# Patient Record
(demographics unavailable — no encounter records)

---

## 2024-10-15 NOTE — HISTORY OF PRESENT ILLNESS
[de-identified] : pentasmidine last thu last bactrim 10/6  ex FT, born in [place].  4yo dx with bronchiolitis obliterans

## 2024-10-15 NOTE — HISTORY OF PRESENT ILLNESS
[de-identified] : pentasmidine last thu last bactrim 10/6  ex FT, born in [place].  2yo dx with bronchiolitis obliterans

## 2024-10-15 NOTE — HISTORY OF PRESENT ILLNESS
[de-identified] : pentasmidine last thu last bactrim 10/6  ex FT, born in [place].  2yo dx with bronchiolitis obliterans

## 2024-11-05 NOTE — HISTORY OF PRESENT ILLNESS
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 His mother reports that he developed a fever yesterday and has been increasingly lethargic today, with difficulty taking his medications. He has no other associated symptoms such as runny nose, headache, or sore throat. He is drinking water but has a decreased appetite and has been sleeping most of the day. He is currently taking 15mg of Prednisone daily, and has not received any stress dose steroids for this episode of fever.  Pertinent Past History:  - Significant Past Medical History: ABCA3 mutation, post-adenoviral bronchiolitis obliterans, pneumomediastinum, H. 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  - Surgical History: Bilateral lung transplant (6/7/24), pleural biopsy.  - Medications: Prednisone 15mg daily, Tacrolimus 1mg BID (dose adjusted on 10/10/24 due to subtherapeutic level), Mycophenolate (dose unchanged), inhaled Pentamidine for PJP prophylaxis (switched from Bactrim due to neutropenia), Fluconazole (discontinued on 9/12/24 due to resolution of Clavispora infection and drug interaction with Tacrolimus), Valcyte (consideration for discontinuation pending cell counts), Amlodipine (for hypertension management, pre-transplant), Vitamin D, Levalbuterol PRN, Acapella, incentive spirometry. Hydrocortisone for stress dosing.  Summary of Prior Encounters:  - Office Visit (8/16/24): Post-transplant follow-up, doing well, breathing room air, no respiratory complaints. Difficulty with home spirometry technique. Attending cardiac rehab twice weekly. Planning to start home schooling. - [facility] (8/8/24): 8 weeks post-transplant, doing well. Normal spirometry, significantly improved from pre-transplant. CXR showed post-surgical changes, no acute disease. Difficulty with home spirometry technique. Two-week bronchoscopy (6/25/24) showed plaques and secretions, treated for Clavispora lusitaniae. Six-week bronchoscopy (7/26/24) showed resolution of plaques and negative cultures. On Tacrolimus, Mycophenolate, Prednisone, Valganciclovir, Fluconazole, Amlodipine, Vitamin D. Attending PT twice weekly. - [facility] (8/20/24): 10 weeks post-transplant, doing well. Normal spirometry. CXR normal. BAL from 7/26/24 showed Mycobacterium avium complex (MAC). Plan for 3-month bronchoscopy with biopsies. Medications continued. Cleared to return home to [place]. - Office Visit (9/11/24): Presented with fatigue, decreased appetite, vomiting, and fever. Tested positive for COVID-19. Started on Remdesivir. Dehydration management. Missed medications retaken. [facility] transplant team consulted. Throat culture, HSV PCR, and RVP ordered. Chest x-ray ordered. - [facility]/ID (9/12/24): 3 months post-transplant, doing well despite recent COVID-19 diagnosis. CXR and PFTs reassuring. Tacrolimus level supratherapeutic, dose adjusted. Bronchoscopy rescheduled due to COVID-19. Flu vaccine deferred. CBC showed leukopenia and anemia. ID consult recommended chest CT with contrast for MAC evaluation, discontinued Fluconazole. - Hematology Consult (10/24/24): Evaluated for neutropenia and anemia. Neutropenia likely drug-induced, improving since stopping Bactrim. Anemia likely mixed etiology (chronic illness and iron deficiency). Recommended IV Venofer. - [facility] (10/10/24): 4 months post-transplant, doing well. Chest CT obtained, results pending. Scheduled for bronchoscopy. Tacrolimus level subtherapeutic, dose increased. Switched to inhaled pentamidine for PJP prophylaxis due to neutropenia. Consider discontinuing Valcyte. Flu vaccine to be administered by PCP.

## 2024-11-05 NOTE — DISCUSSION/SUMMARY
[FreeTextEntry1] : Assessment:  Fever - etiology unclear, possibly related to post-transplant immunosuppression, viral illness, or other infection. Stress dose steroids indicated. History of bilateral lung transplant.  Plan:  1. Fever Management: Start stress dose steroids. Increase Prednisone to 30mg daily until fever resolves. Monitor closely for any other developing symptoms.  2. Diagnostics: Throat and nose swabs sent for culture. Blood work sent for CBC, CMP, and other studies as indicated.  3. Hydration: Encourage continued hydration with water or electrolyte solutions.  4. Medications: Continue current medication regimen, with Prednisone dose adjusted as above. Ensure Nikolay is able to take his medications reliably.  Follow-up: Close follow-up is essential. Recheck in 24-48 hours or sooner if symptoms worsen. Continue planned follow-up with [facility] transplant team.  Coordinated care with [facility] Transplant Team.

## 2024-11-05 NOTE — PHYSICAL EXAM
[Acute Distress] : no acute distress [Alert] : alert [Tired appearing] : tired appearing [NL] : moves all extremities x4, warm, well perfused x4 [FreeTextEntry1] : Cushingoid appearance [FreeTextEntry7] : Slight difference between the bases of the left and right lungs, with increased rhonchi at right base [de-identified] : Cold sore on the left corner of the mouth

## 2024-11-05 NOTE — END OF VISIT
DISCHARGE PLANNING    • Discharge to home or other facility with appropriate resources Progressing        GASTROINTESTINAL - ADULT    • Minimal or absence of nausea and vomiting Progressing    • Maintains or returns to baseline bowel function Progressing [Time Spent: ___ minutes] : I have spent [unfilled] minutes of time on the encounter which excludes teaching and separately reported services.

## 2024-12-03 NOTE — HISTORY OF PRESENT ILLNESS
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 Approximately two hours after the injection, he experienced increased fatigue and wanted to sleep. That same night, he developed a fever of 103F (39.4C). The following morning, the fever persisted, and his mother started him on Tylenol. He was seen in our office on Thursday and Friday last week for evaluation of these symptoms. Blood work at that time showed slightly low levels, but he was deemed stable for discharge home. However, he spiked another fever Friday night after returning home. He continues to experience fatigue, decreased appetite, and shakiness. He has been sleeping excessively, has little interest in eating, and reports chills, cough, and runny nose. He reports the shakiness has been present for a few weeks. He is currently taking 15mg of Prednisone daily and has not received any stress dose steroids for this current episode of fever.  Significant Past Medical History:  ABCA3 mutation Post-adenoviral bronchiolitis obliterans Pneumomediastinum H. flu pneumonitis History of oxygen and BiPAP dependence pre-transplant Steroid-induced adrenal insufficiency Iron deficiency anemia (resolved) History of HSV infection (September 2024) COVID-19 infection (September 2024) Mycobacterium avium complex (MAC) infection (diagnosed July 2024) Neutropenia (attributed to medications)  Surgical History:  Bilateral lung transplant (6/7/24) Pleural biopsy  Medications:  Prednisone 15mg daily Tacrolimus 1mg BID (dose adjusted on 10/10/24 due to subtherapeutic level) Mycophenolate (dose unchanged) Inhaled Pentamidine for PJP prophylaxis (switched from Bactrim due to neutropenia) Fluconazole (discontinued on 9/12/24 due to resolution of Clavispora infection and drug interaction with Tacrolimus) Valcyte (consideration for discontinuation pending cell counts) Amlodipine (for hypertension management, started pre-transplant) Vitamin D Levalbuterol PRN Acapella Incentive spirometry Hydrocortisone for stress dosing Tylenol as needed for fever

## 2024-12-03 NOTE — PHYSICAL EXAM
[Acute Distress] : no acute distress [Alert] : alert [Tired appearing] : tired appearing [NL] : moves all extremities x4, warm, well perfused x4 [FreeTextEntry1] : Cushingoid appearance, tired appearing, but in no acute distress, alert. [FreeTextEntry7] : Slight difference between the bases of the left and right lungs, with increased rhonchi at right base

## 2024-12-03 NOTE — DISCUSSION/SUMMARY
[FreeTextEntry1] : Diagnostic Results:  Rapid flu test: Negative. COVID-19 test: Result pending. Respiratory Viral Panel (RVP): Ordered. CBC, CMP: Ordered.  Assessment: 1. Fever: Likely related to viral illness, possible post-Neupogen injection reaction, or other infection in the setting of immunosuppression. 2. Fatigue, decreased appetite, shakiness: Likely related to ongoing viral illness and/or medication side effects. 3. Dehydration: Possible given decreased oral intake, fever, and reported decreased urination. 4. Status post bilateral lung transplant: Requires close monitoring for complications such as infection and rejection. 5. HSV reactivation: Evidenced by cold sore. 6. Neutropenia: History of drug-induced neutropenia, currently being managed with Neupogen injections. 7. Mycobacterium avium complex (MAC) infection: Diagnosed in July 2024, requires ongoing monitoring.  Plan: 1. Contacted [facility] Lung Transplant Team: Discussed with Danika Thompson NP. 2. Antibiotics: Start Levofloxacin (Levaquin) 500mg daily x 14 days. 3. Anti-emetic: Start Zofran to improve tolerance of liquids and medications. 4. Antipyretic: Continue Tylenol as needed for fever management. 5. Hydration: Encourage increased fluid intake with Pedialyte or Gatorade. 6. Diagnostics: - Respiratory Viral Panel (RVP). - Complete Blood Count (CBC) and Comprehensive Metabolic Panel (CMP). 7. Follow-up: Close follow-up is essential. Recheck in 24-48 hours or sooner if symptoms worsen. Continue planned follow-up with [facility] transplant team on 9/12 and 9/13 for bronchoscopy.  Instructions:  Call the office immediately if symptoms worsen, including increased work of breathing, decreased oxygen saturation, persistent vomiting, or inability to tolerate oral fluids. Continue monitoring urine output and encourage frequent hydration.

## 2024-12-03 NOTE — DISCUSSION/SUMMARY
[FreeTextEntry1] : Diagnostic Results:  Rapid flu test: Negative. COVID-19 test: Result pending. Respiratory Viral Panel (RVP): Ordered. CBC, CMP: Ordered.  Assessment: 1. Fever: Likely related to viral illness, possible post-Neupogen injection reaction, or other infection in the setting of immunosuppression. 2. Fatigue, decreased appetite, shakiness: Likely related to ongoing viral illness and/or medication side effects. 3. Dehydration: Possible given decreased oral intake, fever, and reported decreased urination. 4. Status post bilateral lung transplant: Requires close monitoring for complications such as infection and rejection. 5. HSV reactivation: Evidenced by cold sore. 6. Neutropenia: History of drug-induced neutropenia, currently being managed with Neupogen injections. 7. Mycobacterium avium complex (MAC) infection: Diagnosed in July 2024, requires ongoing monitoring.  Plan: 1. Contacted [facility] Lung Transplant Team: Discussed with Danika Thompson NP. 2. Antibiotics: Start Levofloxacin (Levaquin) 500mg daily x 14 days. 3. Anti-emetic: Start Zofran to improve tolerance of liquids and medications. 4. Antipyretic: Continue Tylenol as needed for fever management. 5. Hydration: Encourage increased fluid intake with Pedialyte or Gatorade. 6. Diagnostics: - Respiratory Viral Panel (RVP). - Complete Blood Count (CBC) and Comprehensive Metabolic Panel (CMP). 7. Follow-up: Close follow-up is essential. Recheck in 24-48 hours or sooner if symptoms worsen. Continue planned follow-up with [facility] transplant team on 9/12 and 9/13 for bronchoscopy.  Instructions:  Call the office immediately if symptoms worsen, including increased work of breathing, decreased oxygen saturation, persistent vomiting, or inability to tolerate oral fluids. Continue monitoring urine output and encourage frequent hydration.

## 2024-12-04 NOTE — BEGINNING OF VISIT
[Medical Records] : medical records [Other: _____] : [unfilled] [Mother] : mother [FreeTextEntry1] : [facility]

## 2024-12-04 NOTE — DISCUSSION/SUMMARY
[FreeTextEntry1] : Nikolay is a 10-year-old male, status post bilateral lung transplant, presenting with recurrent fatigue, decreased appetite, and fever for several days, concerning for possible superimposed infection in the setting of known MAC and recent COVID-19 and enterovirus infections.  Assessment:  Recurrent fever, fatigue, and decreased appetite: Differential includes persistent viral infection (COVID-19, enterovirus), new viral or bacterial infection, or possible MAC exacerbation. Status post bilateral lung transplant: Increased risk of infection due to immunosuppression. History of MAC infection: Requires ongoing monitoring and possible treatment adjustment. History of steroid-induced adrenal insufficiency: Consider stress-dose steroids if fever worsens. ABCA3 mutation, post-adenoviral bronchiolitis obliterans, pneumomediastinum, H. influenzae pneumonitis: Underlying lung disease history. Iron deficiency anemia (stable, not supplementing): Monitor hemoglobin. Pre-transplant hypertension (managed on amlodipine): Monitor blood pressure.  Plan: Diagnostics: - Repeat Respiratory Viral Panel (RVP).. Previously COVID positive, but Paxlovid has multiple interactions with immunosuppressants and child is now out of the window for remdesivir.  - Chest x-ray (CXR) today. - Complete metabolic panel (CMP). - C-reactive protein (CRP). - CMV titers, per [facility] Transplant Team  Treatment: - Empiric Azithromycin for presumed Mycobacterium. - Aggressive hydration. - Start stress dose steroids - prednisone 20mg twice daily.  Consults: Spoke with [facility] Lung Transplant team. Will continue close communication.  Monitoring: - Close follow-up for worsening symptoms. - Monitor urine output and hydration status.  Scheduled Procedures: - Bronchoscopy with the transplant team remains scheduled for Thursday, 9/12, and Friday, 9/13.  Patient Education: - Instructed to return to the office or contact [facility] immediately if symptoms worsen, including increased shortness of breath, worsening fatigue, or high fever.

## 2024-12-04 NOTE — PHYSICAL EXAM
[Alert] : alert [Tired appearing] : tired appearing [NL] : warm, clear [Acute Distress] : no acute distress [Tachycardia] : tachycardia [FreeTextEntry1] : Cushingoid appearance, tired appearing, but in no acute distress, alert. [FreeTextEntry7] : Trace crackles at lung bases

## 2024-12-04 NOTE — HISTORY OF PRESENT ILLNESS
[FreeTextEntry6] : Nikolay, a 10-year-old male with a history of bilateral lung transplant (6/7/24), presents today with worsening fatigue, decreased appetite, and persistent fevers over the last few days. He was seen in this office one week ago (11/25/24) for similar symptoms, at which time a respiratory viral panel (RVP) was positive for both enterovirus and COVID-19. He is currently on his baseline dose of prednisone 15mg daily. He continues to take his other transplant medications as prescribed.  Pertinent Transplant History:  Bilateral lung transplant on 6/7/24 for obliterative bronchiolitis. No history of acute rejection episodes. Immunosuppression regimen: Tacrolimus, Mycophenolate, Prednisone. Bronchoscopy on 7/26/24: Resolved plaques, no fungal growth, but positive bronchoalveolar lavage (BAL) for Mycobacterium avium complex (MAC). Previous positive RVP for enterovirus and COVID-19 (11/25/24). Past Medical History: Significant for ABCA3 mutation, post-adenoviral bronchiolitis obliterans, pneumomediastinum, H. influenzae pneumonitis, history of steroid-induced adrenal insufficiency, iron deficiency anemia (currently stable, not supplementing), and pre-transplant hypertension (managed on amlodipine).  Medications: Tacrolimus, Mycophenolate, Prednisone 15mg daily, Fluconazole (for previous Clavispora lusitaniae infection), Amlodipine, Vitamin D. Other medications as needed for symptom management.

## 2024-12-23 NOTE — DISCUSSION/SUMMARY
[FreeTextEntry1] :  Continue antibiotics.  Resume all therapies and previous activities.  Continue home medications as outlined above. Monitor for any signs or symptoms of infection, rejection, or other complications and contact the transplant team immediately if any concerns arise.  Follow-up in 3 months for 10yo [facility] follow-up visit.

## 2024-12-23 NOTE — PHYSICAL EXAM
[NL] : no acute distress, alert [FreeTextEntry1] : Limited physical exam findings given that patients is on a video visit.

## 2024-12-23 NOTE — HISTORY OF PRESENT ILLNESS
[Home] : at home, [unfilled] , at the time of the visit. [Medical Office: ([facility])___] : at the medical office located in  [Mother] : mother [Verbal consent obtained from patient] : the patient, [unfilled] [de-identified] : Hospital discharge follow-up visit [FreeTextEntry6] : See RN telephone encounter: 12/20/24.  Admission Date: December 10, 2024 Discharge Date: December 18, 2024 Hospital: [facility] Attending Physician: Dr. Jasmine Jimenez  Admission Diagnoses: 1. Fever 2. Lung transplant status  Discharge Diagnoses: 1. Primary: Sinusitis due to Streptococcus pneumoniae 2. Secondary: Bronchiolitis obliterans status post bilateral lung transplant 3. Neutropenia (etiology undetermined) 4. Anemia resolved with transfusion 5. Adrenal insufficiency (secondary to chronic steroid use) 6. Hypertension  Medications at Discharge: Amlodipine 2.5mg daily Augmentin -42.9mg/5ml, 17ml every 12 hours until January 4, 2025 Cetirizine 5mg daily as needed Cholecalciferol 25mcg daily Vitamin D3 (high potency) 25mcg (1000 IU) daily Fluconazole 700mg daily (100mg tablet + three 200mg tablets) Levalbuterol HFA 45mcg/act, 2 puffs three times daily Mycophenolate Mofetil 500mg (two 250mg capsules) twice daily Ofloxacin 0.3% solution, 5 drops in right ear twice daily until December 21, 2024 Omeprazole 10mg daily, 30-60 minutes before food Zofran 4mg orally disintegrating tablet, three times daily as needed for nausea Prednisone 15mg daily (one 10mg tablet + one 5mg tablet) Sodium Chloride 3% nebulizer solution, 4ml inhaled twice daily as needed for cough and URI symptoms Solu-Cortef 100mg injection, 2ml intramuscularly once as needed for severe illness (and seek emergency care) Tacrolimus 1.5mg twice daily (one 0.5mg capsule + one 1mg capsule)  Hospital Course:  Nikolay, a 10-year-old male with a past medical history of bronchiolitis obliterans secondary to adenovirus infection status post bilateral lung transplant (June 2024), adrenal insufficiency, hypertension, and iron deficiency anemia, presented with several weeks of fever, cough, and decreased activity level in the setting of recent viral infections. He was admitted to the pulmonary service from December 10-18, 2024.  Infectious Workup: Initial blood culture, respiratory viral panel (RVP), and quad panel were unremarkable. Chest x-ray was clear, and his lung exam was unremarkable. He was hemodynamically stable, and antibiotics were initially deferred. Immunocompromised Infectious Disease (ICID) was consulted. A bronchoalveolar lavage (BAL) was performed on December 13, 2024, which showed no signs of acute rejection. Cultures from the BAL grew Streptococcus pneumoniae. Nikolay was started on intravenous (IV) Unasyn from December 14-16 and transitioned to oral Augmentin for a three-week course (December 16, 2024 - January 4, 2025).  Neutropenia: Neutropenia was noted on laboratory studies, raising concerns for viral suppression, medication effect, or an oncologic process. Mycophenolate Mofetil (MMF) was decreased to half the dose, and Hematology/Oncology was consulted. Concern for a liquid oncologic process was low; however, the pancytopenia seemed out of proportion to medication or viral etiology. A bone marrow biopsy was obtained concurrently with the bronchoscopy on December 13 and was largely normal. He received one dose of granulocyte-colony stimulating factor (G-CSF) and one infusion of intravenous immunoglobulin (IVIG) during his admission.  Otitis Media/Sinusitis: On December 13, during the bronchoscopy, purulent drainage was noted in his right ear. Culture of the drainage grew Streptococcus pneumoniae. A computed tomography (CT) scan of the sinuses on December 14 showed diffusely opacified paranasal sinuses, as well as bony thinning and demineralization involving the ethmoid roof bone and lamina papyracea, concerning for sinusitis. He was started on IV Unasyn as noted above and transitioned to oral Augmentin. Ofloxacin ear drops were prescribed for the right ear infection.  Anemia: Nikolay's hemoglobin dropped to the 7s during admission, related to pancytopenia. He received one unit of packed red blood cells (PRBCs) on December 16, after which his hemoglobin increased to 10.9 g/dL.  Adrenal Insufficiency: Endocrinology was consulted to clarify the stress dose steroid plan for his adrenal insufficiency, which is secondary to chronic steroid use. His current immunosuppression dose of Prednisone 15mg daily is considered sufficient for a moderate stress dose. If he comes off his immunosuppressive dose, the stress dose steroid plan will be revisited.  Hypertension: His hypertension is being managed with his home dose of amlodipine 2.5mg daily.  Discharge Plan:  Continue antibiotics.  Resume all therapies and previous activities.  Continue home medications as outlined above. Monitor for any signs or symptoms of infection, rejection, or other complications and contact the transplant team immediately if any concerns arise.  ---  Since discharge, Nikolay is doing well.  Continuing to take antibiotics.

## 2025-07-10 NOTE — HISTORY OF PRESENT ILLNESS
[FreeTextEntry2] : Nikolay is a 11-year-old male with a complex medical history notable for ABCA3 mutation, post-adenoviral bronchiolitis obliterans, and previous oxygen and BiPAP dependence.  He required oxygen until 10 years of age in June 2024 when he received a bilateral lung transplant at [facility].  Unfortunately, he has been hospitalized multiple times over the past year at [facility] and endocrine team has been consulted in the setting of adrenal insufficiency and hyperglycemia.  Team from [facility] has asked me to take over local care as Nikolay and his mom live in [place].  1.  Glycemic control.  Notes reveal hemoglobin A1c elevated to 5.8% in May 2024.  Mom notes Nikolay required some mealtime insulin during this hospitalization but it was never continue at home.  She was taking fingersticks Premeal and never noted significant hyperglycemia.  With recent hospitalization in the setting of anemia in June 2025, hemoglobin A1c was noted to be normal at 5.2%.  He was however at 1 hospitalization noted to have a positive zinc transporter antibody Since last hospitalization, mom has been taking 2-3 blood sugars a week and notes fasting blood sugars around 80 to 90 mg/dL and postprandial blood sugars between 80 to 110 mg/dL.  2.  Adrenal insufficiency in the setting of chronic prednisone use secondary to lung transplant.  Per [facility] stress dose letter, Nikolay takes 7.5 mg prednisone daily and 10 mg twice daily in the setting of fever illness.  Mom has prescriptions for both prednisone and Solu-Cortef in the setting of emergency.  Family history significant for maternal grandfather with diabetes.

## 2025-07-10 NOTE — CONSULT LETTER
[Dear  ___] : Dear  [unfilled], [Consult Letter:] : I had the pleasure of evaluating your patient, [unfilled]. [Please see my note below.] : Please see my note below. [Consult Closing:] : Thank you very much for allowing me to participate in the care of this patient.  If you have any questions, please do not hesitate to contact me. [Sincerely,] : Sincerely, [FreeTextEntry3] : Lizette Grajeda MD  [facility] Physician [facility] Division of Pediatric Endocrinology P: [phone] F: [phone]

## 2025-07-10 NOTE — REASON FOR VISIT
[Telephone (audio)] : This telephonic visit was provided via audio only technology. [FreeTextEntry3] : Mr. PAN  [Consultation] : a consultation visit

## 2025-07-10 NOTE — HISTORY OF PRESENT ILLNESS
[FreeTextEntry2] : Nikolay is a 11-year-old male with a complex medical history notable for ABCA3 mutation, post-adenoviral bronchiolitis obliterans, and previous oxygen and BiPAP dependence.  He required oxygen until 10 years of age in June 2024 when he received a bilateral lung transplant at [facility].  Unfortunately, he has been hospitalized multiple times over the past year at [facility] and endocrine team has been consulted in the setting of adrenal insufficiency and hyperglycemia.  Team from [facility] has asked me to take over local care as Nikolay and his mom live in [place].  1.  Glycemic control.  Notes reveal hemoglobin A1c elevated to 5.8% in May 2024.  Mom notes Nikolay required some mealtime insulin during this hospitalization but it was never continue at home.  She was taking fingersticks Premeal and never noted significant hyperglycemia.  With recent hospitalization in the setting of anemia in June 2025, hemoglobin A1c was noted to be normal at 5.2%.  He was however at 1 hospitalization noted to have a positive zinc transporter antibody Since last hospitalization, mom has been taking 2-3 blood sugars a week and notes fasting blood sugars around 80 to 90 mg/dL and postprandial blood sugars between 80 to 110 mg/dL.  2.  Adrenal insufficiency in the setting of chronic prednisone use secondary to lung transplant.  Per [facility] stress dose letter, Nikolay takes 7.5 mg prednisone daily and 10 mg twice daily in the setting of fever illness.  Mom has prescriptions for both prednisone and Solu-Cortef in the setting of emergency.  Family history significant for maternal grandfather with diabetes.

## 2025-07-10 NOTE — ASSESSMENT
[FreeTextEntry1] : Nikolay is a 11-year-old male with a complex medical history notable for ABCA3 mutation, post-adenoviral bronchiolitis obliterans, and previous oxygen and BiPAP dependence.  He required oxygen until 10 years of age in June 2024 when he received a bilateral lung transplant at [facility].  With regard to adrenal insufficiency, will continue stress dose protocol with prednisone 10 mg twice daily per [facility].  Will continue to follow closely.  With regard to concerns for elevated hemoglobin A1c, I have discussed that this is likely secondary to steroid-induced hyperglycemia and is reassuring that hemoglobin A1c has decreased to normal and that fingersticks have been normal as well.  We have discussed the possibility of predisposition to type 1 diabetes given positive zinc transporter antibody.  Would like to repeat all diabetes antibodies to understand this risk. Will continue 2-3 fasting and postprandial fingersticks weekly.  Mom to let me know if fasting fingersticks are greater than 100 mg/dL or postprandial fingersticks are greater than 180 mg/dL.  Labs today hemoglobin A1c, fructosamine, glutamic acid decarboxylase antibody, antiislet cell antibody, antizinc transporter antibody, anti-insulin antibody.

## 2025-07-10 NOTE — CONSULT LETTER
[Dear  ___] : Dear  [unfilled], [Consult Letter:] : I had the pleasure of evaluating your patient, [unfilled]. [Please see my note below.] : Please see my note below. [Consult Closing:] : Thank you very much for allowing me to participate in the care of this patient.  If you have any questions, please do not hesitate to contact me. [Sincerely,] : Sincerely, [FreeTextEntry3] : Lizette Grajeda MD  [facility] Physician [facility] Division of Pediatric Endocrinology P: [phone] F: [phone]

## 2025-07-10 NOTE — ASSESSMENT
[FreeTextEntry1] : Nikolay is a 11-year-old male with a complex medical history notable for ABCA3 mutation, post-adenoviral bronchiolitis obliterans, and previous oxygen and BiPAP dependence.  He required oxygen until 10 years of age in June 2024 when he received a bilateral lung transplant at [facility].  With regard to adrenal insufficiency, will continue stress dose protocol with prednisone 10 mg twice daily per [facility].  Will continue to follow closely.  With regard to concerns for elevated hemoglobin A1c, I have discussed that this is likely secondary to steroid-induced hyperglycemia and is reassuring that hemoglobin A1c has decreased to normal and that fingersticks have been normal as well.  We have discussed the possibility of predisposition to type 1 diabetes given positive zinc transporter antibody.  Would like to repeat all diabetes antibodies to understand this risk. Will continue 2-3 fasting and postprandial fingersticks weekly.  Mom to let me know if fasting fingersticks are greater than 100 mg/dL or postprandial fingersticks are greater than 180 mg/dL.  Labs today hemoglobin A1c, fructosamine, glutamic acid decarboxylase antibody, antiislet cell antibody, antizinc transporter antibody, anti-insulin antibody.